# Patient Record
Sex: FEMALE | Race: WHITE | Employment: FULL TIME | URBAN - METROPOLITAN AREA
[De-identification: names, ages, dates, MRNs, and addresses within clinical notes are randomized per-mention and may not be internally consistent; named-entity substitution may affect disease eponyms.]

---

## 2016-11-21 LAB — PAP-ABSTRACT: NORMAL

## 2019-05-14 ENCOUNTER — TRANSFERRED RECORDS (OUTPATIENT)
Dept: MULTI SPECIALTY CLINIC | Facility: CLINIC | Age: 29
End: 2019-05-14

## 2019-05-14 LAB
CHOLEST SERPL-MCNC: 180 MG/DL (ref 100–199)
GLUCOSE SERPL-MCNC: 77 MG/DL (ref 65–100)
HDLC SERPL-MCNC: 58 MG/DL
LDLC SERPL CALC-MCNC: 100 MG/DL
NONHDLC SERPL-MCNC: 122 MG/DL
PAP-ABSTRACT: NORMAL
TRIGL SERPL-MCNC: 110 MG/DL
TSH SERPL-ACNC: 2.53 UIU/ML (ref 0.35–4.94)

## 2020-01-20 ENCOUNTER — OFFICE VISIT (OUTPATIENT)
Dept: FAMILY MEDICINE | Facility: CLINIC | Age: 30
End: 2020-01-20
Payer: COMMERCIAL

## 2020-01-20 VITALS
TEMPERATURE: 98.3 F | DIASTOLIC BLOOD PRESSURE: 60 MMHG | WEIGHT: 168 LBS | RESPIRATION RATE: 12 BRPM | HEART RATE: 84 BPM | SYSTOLIC BLOOD PRESSURE: 90 MMHG | HEIGHT: 71 IN | BODY MASS INDEX: 23.52 KG/M2

## 2020-01-20 DIAGNOSIS — K64.4 EXTERNAL HEMORRHOIDS: Primary | ICD-10-CM

## 2020-01-20 DIAGNOSIS — K58.2 IRRITABLE BOWEL SYNDROME WITH BOTH CONSTIPATION AND DIARRHEA: ICD-10-CM

## 2020-01-20 PROCEDURE — 99203 OFFICE O/P NEW LOW 30 MIN: CPT | Performed by: NURSE PRACTITIONER

## 2020-01-20 RX ORDER — DESOGESTREL AND ETHINYL ESTRADIOL 0.15-0.03
KIT ORAL
COMMUNITY
Start: 2019-05-14

## 2020-01-20 ASSESSMENT — ENCOUNTER SYMPTOMS
DIZZINESS: 0
FATIGUE: 0
SORE THROAT: 0
NAUSEA: 0
DIAPHORESIS: 0
WHEEZING: 0
EYE DISCHARGE: 0
SHORTNESS OF BREATH: 0
CHILLS: 0
EYE ITCHING: 0
COUGH: 0
FEVER: 0
HEADACHES: 0
DIARRHEA: 1
RHINORRHEA: 0
LIGHT-HEADEDNESS: 0
ROS GI COMMENTS: HEMORRHOID
SINUS PRESSURE: 0
CONSTIPATION: 1
BLOOD IN STOOL: 1

## 2020-01-20 ASSESSMENT — PAIN SCALES - GENERAL: PAINLEVEL: NO PAIN (0)

## 2020-01-20 ASSESSMENT — MIFFLIN-ST. JEOR: SCORE: 1593.17

## 2020-01-20 NOTE — PROGRESS NOTES
"Subjective     Phyllis Cortes is a 29 year old female who presents to clinic today for the following health issues:    HPI       Hemorrhoids       Duration: 9 days-starting to improve now    Description:   Pain: YES  Itching: YES    Accompanying signs and symptoms: skin tag in same area as hemorrhoid  Blood in stool: YES- occasional red blood  Changes in stool pattern: no     History (similar episodes/previous evaluation): Has IBS    Precipitating or alleviating factors: None    Therapies tried and outcome: preparation H first 5 days did not seem to help    Current Outpatient Medications   Medication Sig Dispense Refill     desogestrel-ethinyl estradiol (APRI) 0.15-30 MG-MCG tablet TAKE 1 TABLET BY MOUTH DAILY, TAKE ACTIVE TABLETS CONTINOUSLY FOR 4 CYCLES THEN TAKE A COMPLETE PACK       No Known Allergies    Reviewed and updated as needed this visit by Provider       Did have a hemorrhoid to rectal area. Has IBS and if constipated and has to strain will have bleeding. Did use some preparation H and really noes not feel like that helps. Was really constipated and that is when the hemorrhoid started. Did have pain for about 1 week. Has been taking fiber gummy's and that really does not help much. Eats a lot of carbs and cheese. Knows needs to eat more fruits and veggies. Does try to drink a lot of water.  Has seen GI in the past. Has had allergy testing as well that was normal. Will be constipated and then will have abdominal pain and then knows needs to get to the BR. Will have to push hard at times to have BM and then will be diarrhea. Will have firm hard stool followed directly by diarrhea. Gassy a lot of the time.         Objective    BP 90/60 (BP Location: Left arm, Patient Position: Sitting, Cuff Size: Adult Regular)   Pulse 84   Temp 98.3  F (36.8  C) (Tympanic)   Resp 12   Ht 1.803 m (5' 11\")   Wt 76.2 kg (168 lb)   LMP 01/06/2020   BMI 23.43 kg/m    Body mass index is 23.43 kg/m .          Assessment " & Plan      Review of Systems   Constitutional: Negative for chills, diaphoresis, fatigue and fever.   HENT: Negative for ear discharge, ear pain, hearing loss, rhinorrhea, sinus pressure and sore throat.    Eyes: Negative for discharge and itching.   Respiratory: Negative for cough, shortness of breath and wheezing.    Gastrointestinal: Positive for blood in stool, constipation and diarrhea. Negative for nausea.        Hemorrhoid    Skin: Negative for rash.   Neurological: Negative for dizziness, light-headedness and headaches.       Physical Exam  Constitutional:       Appearance: She is well-developed.   HENT:      Right Ear: Tympanic membrane and external ear normal. No middle ear effusion. Tympanic membrane is not erythematous.      Left Ear: Tympanic membrane and external ear normal.  No middle ear effusion. Tympanic membrane is not erythematous.      Nose: No mucosal edema or rhinorrhea.   Cardiovascular:      Rate and Rhythm: Normal rate and regular rhythm.      Heart sounds: Normal heart sounds.   Pulmonary:      Effort: Pulmonary effort is normal.      Breath sounds: Normal breath sounds.   Abdominal:      General: Bowel sounds are normal.      Palpations: Abdomen is soft.      Tenderness: There is generalized abdominal tenderness. There is no right CVA tenderness, left CVA tenderness, guarding or rebound.   Genitourinary:      Skin:     General: Skin is warm and dry.   Neurological:      Mental Status: She is alert.         1. External hemorrhoids  Order placed, plans to call per self and set up  - COLORECTAL SURGERY REFERRAL    2. Irritable bowel syndrome with both constipation and diarrhea  Order placed, plans to call per self and set up  Encouraged to try to gradually increase fiber in diet  - GASTROENTEROLOGY ADULT REF CONSULT ONLY       Return in about 1 month (around 2/20/2020), or if symptoms worsen or fail to improve.    Ute Peguero, [facility]